# Patient Record
Sex: FEMALE | Race: WHITE | NOT HISPANIC OR LATINO | Employment: PART TIME | URBAN - METROPOLITAN AREA
[De-identification: names, ages, dates, MRNs, and addresses within clinical notes are randomized per-mention and may not be internally consistent; named-entity substitution may affect disease eponyms.]

---

## 2017-03-17 ENCOUNTER — OFFICE VISIT (OUTPATIENT)
Dept: OBSTETRICS AND GYNECOLOGY | Age: 42
End: 2017-03-17

## 2017-03-17 VITALS
DIASTOLIC BLOOD PRESSURE: 70 MMHG | SYSTOLIC BLOOD PRESSURE: 120 MMHG | WEIGHT: 149 LBS | HEIGHT: 65 IN | BODY MASS INDEX: 24.83 KG/M2

## 2017-03-17 DIAGNOSIS — N64.4 BREAST TENDERNESS: Primary | ICD-10-CM

## 2017-03-17 PROCEDURE — 99213 OFFICE O/P EST LOW 20 MIN: CPT | Performed by: NURSE PRACTITIONER

## 2017-03-17 NOTE — PROGRESS NOTES
"Subjective   Megan Nielsen is a 41 y.o. female is here today as a self referral.    History of Present Illness   Chief Complaint   Patient presents with   • Breast Problem     Change in appearance of right breast         Patient here with some questions about breast tenderness and some areas on her right nipple that she hadn't noticed before.  She thinks that the tenderness is cyclical the more that she has watched it and she is currently not having breast tenderness at all but still wanted to come in to be checked.  The area on her nipple has not changed since she first noticed it and the only reason she noticed it was because she was doing a self breast exam due to tenderness.  She does have some breast cancer in her family.  She also admits to drinking \"a lot\" of caffeine and states that has increased lately.  Her breast tenderness has been more around midcycle and then seems to go away with her period.  She has not had any nipple discharge or changes in other breast tissue.      The following portions of the patient's history were reviewed and updated as appropriate: allergies, current medications, past family history, past medical history, past social history, past surgical history and problem list.    Review of Systems   Constitutional: Negative.    HENT: Negative.    Eyes: Negative.    Respiratory: Negative.    Cardiovascular: Negative.    Gastrointestinal: Negative.    Endocrine: Negative.    Genitourinary: Negative.    Musculoskeletal: Negative.    Skin: Negative.    Allergic/Immunologic: Negative.    Neurological: Negative.    Hematological: Negative.    Psychiatric/Behavioral: Negative.        Objective   Physical Exam   Constitutional: She appears well-developed and well-nourished.   Pulmonary/Chest: Right breast exhibits no inverted nipple, no mass, no nipple discharge, no skin change and no tenderness. Left breast exhibits no inverted nipple, no mass, no nipple discharge, no skin change and no " tenderness.   Breast exam is normal.  There are some small, white sebaceous glands noted on both nipples.  These are the areas that she had never noticed before.  There is no nipple discharge or redness.           Assessment/Plan   Megan was seen today for breast problem.    Diagnoses and all orders for this visit:    Breast tenderness      Discussed breast changes and exam with patient.  Areas on bilateral nipples look like normal, sebaceous glands.  If she feels like there continues to be changes or concerns she may consider a consult with a breast surgeon.  I also offered a diagnostic mammogram for her increased breast tenderness but she declines it at this time and would prefer to observe for a little longer and try decreasing her caffeine intake.  She will follow up for a breast check with Dr Wood in 4-6 weeks but will call prior to that if she notices any further changes.  She will also try vitamin e and primrose oil supplements as well as decreasing caffeine.

## 2017-04-19 ENCOUNTER — OFFICE VISIT (OUTPATIENT)
Dept: OBSTETRICS AND GYNECOLOGY | Age: 42
End: 2017-04-19

## 2017-04-19 VITALS
WEIGHT: 151 LBS | SYSTOLIC BLOOD PRESSURE: 100 MMHG | BODY MASS INDEX: 25.16 KG/M2 | HEIGHT: 65 IN | DIASTOLIC BLOOD PRESSURE: 64 MMHG

## 2017-04-19 DIAGNOSIS — N64.4 MASTODYNIA OF RIGHT BREAST: Primary | ICD-10-CM

## 2017-04-19 PROCEDURE — 99213 OFFICE O/P EST LOW 20 MIN: CPT | Performed by: OBSTETRICS & GYNECOLOGY

## 2017-04-19 NOTE — PROGRESS NOTES
Subjective     Chief Complaint   Patient presents with   • Gynecologic Exam     6 wk follow up breast check       History of Present Illness  Megan Nielsen is a 41 y.o. female is being seen today for Reevaluation of right breast mastodynia.  Patient denies any type of discharge bleeding or palpable mass.  She was seen 6 weeks ago and had a relatively normal examination.  Chief Complaint   Patient presents with   • Gynecologic Exam     6 wk follow up breast check   .        The following portions of the patient's history were reviewed and updated as appropriate: allergies, current medications, past family history, past medical history, past social history, past surgical history and problem list.    PAST MEDICAL HISTORY  Past Medical History:   Diagnosis Date   • ASCUS of cervix with negative high risk HPV    • Bacterial pneumonia    • H/O bladder infections    • Pyelonephritis    • Vaginal delivery    • Varicose vein of leg      OB History     No data available        Past Surgical History:   Procedure Laterality Date   • TUBAL ABDOMINAL LIGATION       No family history on file.  History   Smoking Status   • Never Smoker   Smokeless Tobacco   • Not on file     No current outpatient prescriptions on file.    There is no immunization history on file for this patient.    Review of Systems       Except as outlined in history of physical illness, patient denies any changes in her GYN, , GI systems. All other systems reviewed are negative.Has no breast discharge, no change in appetite, no change in bowel movements, no  change in urinary function, and no constitutional changes    Objective   Physical Exam     Alert and oriented, abdomen soft nontender, breast or even and symmetrical.  There is no lymphadenopathy no erythema no asymmetry.  Normal sebaceous glands glands a Ward were discussed and pointed out.      Assessment/Plan   Megan was seen today for gynecologic exam.    Diagnoses and all orders for this  visit:    Mastodynia of right breast    Resolved with normal examination again today.  Patient will call with any changes or concerns otherwise return after her husbands 4 months medical.  For annual examination and mammogram             EMR Dragon/ Transcription disclaimer:  Much of the encounter note is an electronic transcription/translation of spoken language to printed text. The electronic translation of spoken language may permit erroneous, or at times, nonessential words or phrases to be inadvertently transcribes; Although i have reviewed the note for such errors, some may still exist.

## 2017-12-11 ENCOUNTER — OFFICE VISIT (OUTPATIENT)
Dept: OBSTETRICS AND GYNECOLOGY | Age: 42
End: 2017-12-11

## 2017-12-11 VITALS
BODY MASS INDEX: 27.32 KG/M2 | WEIGHT: 164 LBS | DIASTOLIC BLOOD PRESSURE: 80 MMHG | HEIGHT: 65 IN | SYSTOLIC BLOOD PRESSURE: 140 MMHG

## 2017-12-11 DIAGNOSIS — Z01.419 ENCOUNTER FOR GYNECOLOGICAL EXAMINATION: Primary | ICD-10-CM

## 2017-12-11 PROCEDURE — 99396 PREV VISIT EST AGE 40-64: CPT | Performed by: OBSTETRICS & GYNECOLOGY

## 2017-12-11 NOTE — PROGRESS NOTES
"Subjective     Chief Complaint   Patient presents with   • Gynecologic Exam     Annual         History of Present Illness      Megan Nielsen is a very pleasant  42 y.o. female who presents for annual exam.  Mammo Exam Today, Contraception discussed, Exercise twice weekly.      Obstetric History:  OB History      Para Term  AB Living    3 3 3       SAB TAB Ectopic Multiple Live Births                 Menstrual History:     Patient's last menstrual period was 2017.       Sexual History:       Past Medical History:   Diagnosis Date   • ASCUS of cervix with negative high risk HPV    • Bacterial pneumonia    • H/O bladder infections    • Pyelonephritis    • Vaginal delivery    • Varicose vein of leg      Past Surgical History:   Procedure Laterality Date   • TUBAL ABDOMINAL LIGATION         SOCIAL Hx:      The following portions of the patient's history were reviewed and updated as appropriate: allergies, current medications, past family history, past medical history, past social history, past surgical history and problem list.    Review of Systems        Except as outlined in history of physical illness, patient denies any changes in her GYN, , GI systems.  All other systems reviewed were negative.         Objective   Physical Exam    /80  Ht 165.7 cm (65.25\")  Wt 74.4 kg (164 lb)  LMP 2017  BMI 27.08 kg/m2    General: Patient is alert and oriented and appears overall healthy  Neck: Is supple without thyromegaly, no carotid bruits and no lymphadenopathy  Lungs: Clear bilaterally, no wheezing, rhonchi, or rales.  Respiratory rate is normal  Breast: Even, symmetrical, no lymphadenopathy, no retraction, no masses or cysts  Heart: Regular rate and rhythm are appreciated, no murmurs or rubs are heard  Abdomen: Is soft, without organomegaly, bowel sounds are positive, there is no                                rebound or guarding and palpation does not produce any discomfort  Back: " Nontender without CVA tenderness  Pelvic: External genitalia appear normal and consistent with mature female.  BUS normal                            Vagina is clean dry without discharge and appears adequately estrogenized, no               lesions or masses are present                         Cervix is noninflamed without discharge or lesions.  There is no cervical motion             tenderness.                Uterus is nonenlarged, without tenderness, and no masses or abnormalities are  present               Adnexa are non-enlarged, non tender               Rectal exam reveals adequate sphincter tone and no masses or lesions are                     appreciated on digital rectal examination.    There is no problem list on file for this patient.                Assessment/Plan   Megan was seen today for gynecologic exam.    Diagnoses and all orders for this visit:    Encounter for gynecological examination  -     IGP, Apt HPV,rfx 16 / 18,45 - ThinPrep Vial, Cervix  -     Vitamin D 25 Hydroxy  -     Comprehensive metabolic panel  -     Lipid panel  -     CBC and Differential  -     TSH        Annual Well Woman Exam    Discussed today's findings and concerns with patient in detail.  Continue to recommend regular exercise to include cardiovascular and resistance training and breast self-exam. Wellness lab, mammography, and pap smear, in accordance with age guidelines.  Nutritional  recommendations  were discussed.    All of the patient's questions were addressed and answered, I have encouraged her to call for today's test results if she has not received them within 10 days.  Patient is advised to call with any change in her condition or with any other questions, otherwise return in 12 months for annual examination.

## 2017-12-15 LAB
CYTOLOGIST CVX/VAG CYTO: ABNORMAL
CYTOLOGY CVX/VAG DOC THIN PREP: ABNORMAL
DX ICD CODE: ABNORMAL
DX ICD CODE: ABNORMAL
HIV 1 & 2 AB SER-IMP: ABNORMAL
HPV I/H RISK 4 DNA CVX QL PROBE+SIG AMP: NEGATIVE
OTHER STN SPEC: ABNORMAL
PATH REPORT.FINAL DX SPEC: ABNORMAL
PATHOLOGIST CVX/VAG CYTO: ABNORMAL
STAT OF ADQ CVX/VAG CYTO-IMP: ABNORMAL

## 2018-12-18 ENCOUNTER — OFFICE VISIT (OUTPATIENT)
Dept: OBSTETRICS AND GYNECOLOGY | Age: 43
End: 2018-12-18

## 2018-12-18 VITALS
WEIGHT: 170 LBS | BODY MASS INDEX: 28.32 KG/M2 | DIASTOLIC BLOOD PRESSURE: 80 MMHG | SYSTOLIC BLOOD PRESSURE: 120 MMHG | HEIGHT: 65 IN

## 2018-12-18 DIAGNOSIS — Z13.220 SCREENING CHOLESTEROL LEVEL: ICD-10-CM

## 2018-12-18 DIAGNOSIS — Z01.419 ENCOUNTER FOR GYNECOLOGICAL EXAMINATION: Primary | ICD-10-CM

## 2018-12-18 PROCEDURE — 99396 PREV VISIT EST AGE 40-64: CPT | Performed by: OBSTETRICS & GYNECOLOGY

## 2018-12-18 RX ORDER — DOXYCYCLINE HYCLATE 100 MG/1
100 CAPSULE ORAL 2 TIMES DAILY
COMMUNITY
End: 2022-02-02

## 2018-12-18 NOTE — PROGRESS NOTES
"Subjective     Chief Complaint   Patient presents with   • Gynecologic Exam     Annual:last pap 2017,mammo appt 2019         History of Present Illness      Megan Nielsen is a very pleasant  43 y.o. female who presents for annual exam.  Mammo Exam scheduled for January, Contraception tubal ligation, Exercise 2 times a week elliptical and hand weights  Patient overall is doing well no gynecological concerns or complaints she is exercising regularly she has a mammogram scheduled in January she is getting her Pap smear today..      Obstetric History:  OB History      Para Term  AB Living    3 3 3          SAB TAB Ectopic Molar Multiple Live Births                        Menstrual History:     Patient's last menstrual period was 2018 (approximate).       Sexual History:       Past Medical History:   Diagnosis Date   • ASCUS of cervix with negative high risk HPV    • Bacterial pneumonia    • H/O bladder infections    • Pyelonephritis    • Rosacea    • Vaginal delivery    • Varicose vein of leg      Past Surgical History:   Procedure Laterality Date   • TUBAL ABDOMINAL LIGATION         SOCIAL Hx:      The following portions of the patient's history were reviewed and updated as appropriate: allergies, current medications, past family history, past medical history, past social history, past surgical history and problem list.    Review of Systems        Except as outlined in history of physical illness, patient denies any changes in her GYN, , GI systems.  All other systems reviewed were negative.         Objective   Physical Exam    /80   Ht 165.7 cm (65.25\")   Wt 77.1 kg (170 lb)   LMP 2018 (Approximate)   BMI 28.07 kg/m²     General: Patient is alert and oriented and appears overall healthy  Neck: Is supple without thyromegaly, no carotid bruits and no lymphadenopathy  Lungs: Clear bilaterally, no wheezing, rhonchi, or rales.  Respiratory rate is normal  Breast: Even, " symmetrical, no lymphadenopathy, no retraction, no masses or cysts  Heart: Regular rate and rhythm are appreciated, no murmurs or rubs are heard  Abdomen: Is soft, without organomegaly, bowel sounds are positive, there is no                                rebound or guarding and palpation does not produce any discomfort  Back: Nontender without CVA tenderness  Pelvic: External genitalia appear normal and consistent with mature female.  BUS normal                            Vagina is clean dry without discharge and appears adequately estrogenized, no               lesions or masses are present                         Cervix is noninflamed without discharge or lesions.  There is no cervical motion             tenderness.                Uterus is nonenlarged, without tenderness, and no masses or abnormalities are  present               Adnexa are non-enlarged, non tender               Rectal exam reveals adequate sphincter tone and no masses or lesions are                     appreciated on digital rectal examination.    There is no problem list on file for this patient.                Assessment/Plan   Megan was seen today for gynecologic exam.    Diagnoses and all orders for this visit:    Encounter for gynecological examination  -     IGP, Apt HPV,rfx 16 / 18,45  -     CBC (No Diff)  -     Comprehensive Metabolic Panel  -     Thyroid Cascade Profile  -     Lipid Panel  -     Vitamin D 25 Hydroxy    Screening cholesterol level  -     CBC (No Diff)  -     Comprehensive Metabolic Panel  -     Thyroid Cascade Profile  -     Lipid Panel  -     Vitamin D 25 Hydroxy        Annual Well Woman Exam    Discussed today's findings and concerns with patient in detail.  Continue to recommend regular exercise to include cardiovascular and resistance training and breast self-exam. Wellness lab, mammography, and pap smear, in accordance with age guidelines.  Nutritional  recommendations  were discussed.    All of the patient's  questions were addressed and answered, I have encouraged her to call for today's test results if she has not received them within 10 days.  Patient is advised to call with any change in her condition or with any other questions, otherwise return in 12 months for annual examination.

## 2018-12-19 LAB
25(OH)D3+25(OH)D2 SERPL-MCNC: 30.7 NG/ML (ref 30–100)
ALBUMIN SERPL-MCNC: 4.7 G/DL (ref 3.5–5.2)
ALBUMIN/GLOB SERPL: 2.1 G/DL
ALP SERPL-CCNC: 39 U/L (ref 39–117)
ALT SERPL-CCNC: 17 U/L (ref 1–33)
AST SERPL-CCNC: 14 U/L (ref 1–32)
BILIRUB SERPL-MCNC: 1.1 MG/DL (ref 0.1–1.2)
BUN SERPL-MCNC: 17 MG/DL (ref 6–20)
BUN/CREAT SERPL: 20.7 (ref 7–25)
CALCIUM SERPL-MCNC: 9.6 MG/DL (ref 8.6–10.5)
CHLORIDE SERPL-SCNC: 103 MMOL/L (ref 98–107)
CHOLEST SERPL-MCNC: 195 MG/DL (ref 0–200)
CO2 SERPL-SCNC: 23.1 MMOL/L (ref 22–29)
CREAT SERPL-MCNC: 0.82 MG/DL (ref 0.57–1)
ERYTHROCYTE [DISTWIDTH] IN BLOOD BY AUTOMATED COUNT: 12.8 % (ref 11.7–13)
GLOBULIN SER CALC-MCNC: 2.2 GM/DL
GLUCOSE SERPL-MCNC: 102 MG/DL (ref 65–99)
HCT VFR BLD AUTO: 40.9 % (ref 35.6–45.5)
HDLC SERPL-MCNC: 68 MG/DL (ref 40–60)
HGB BLD-MCNC: 13.8 G/DL (ref 11.9–15.5)
LDLC SERPL CALC-MCNC: 110 MG/DL (ref 0–100)
MCH RBC QN AUTO: 30.3 PG (ref 26.9–32)
MCHC RBC AUTO-ENTMCNC: 33.7 G/DL (ref 32.4–36.3)
MCV RBC AUTO: 89.7 FL (ref 80.5–98.2)
PLATELET # BLD AUTO: 178 10*3/MM3 (ref 140–500)
POTASSIUM SERPL-SCNC: 3.9 MMOL/L (ref 3.5–5.2)
PROT SERPL-MCNC: 6.9 G/DL (ref 6–8.5)
RBC # BLD AUTO: 4.56 10*6/MM3 (ref 3.9–5.2)
SODIUM SERPL-SCNC: 142 MMOL/L (ref 136–145)
TRIGL SERPL-MCNC: 87 MG/DL (ref 0–150)
TSH SERPL DL<=0.005 MIU/L-ACNC: 1.01 UIU/ML (ref 0.45–4.5)
VLDLC SERPL CALC-MCNC: 17.4 MG/DL (ref 5–40)
WBC # BLD AUTO: 4.76 10*3/MM3 (ref 4.5–10.7)

## 2018-12-20 ENCOUNTER — TELEPHONE (OUTPATIENT)
Dept: OBSTETRICS AND GYNECOLOGY | Age: 43
End: 2018-12-20

## 2018-12-20 LAB
CYTOLOGIST CVX/VAG CYTO: NORMAL
CYTOLOGY CVX/VAG DOC THIN PREP: NORMAL
DX ICD CODE: NORMAL
HIV 1 & 2 AB SER-IMP: NORMAL
HPV I/H RISK 4 DNA CVX QL PROBE+SIG AMP: NEGATIVE
Lab: NORMAL
OTHER STN SPEC: NORMAL
PATH REPORT.FINAL DX SPEC: NORMAL
STAT OF ADQ CVX/VAG CYTO-IMP: NORMAL

## 2018-12-20 NOTE — TELEPHONE ENCOUNTER
----- Message from Adis Wood MD sent at 12/19/2018  4:09 PM EST -----  Please notify pt. That labs are with in normal limits

## 2019-01-08 ENCOUNTER — PROCEDURE VISIT (OUTPATIENT)
Dept: OBSTETRICS AND GYNECOLOGY | Age: 44
End: 2019-01-08

## 2019-01-08 ENCOUNTER — APPOINTMENT (OUTPATIENT)
Dept: WOMENS IMAGING | Facility: HOSPITAL | Age: 44
End: 2019-01-08

## 2019-01-08 DIAGNOSIS — Z12.31 VISIT FOR SCREENING MAMMOGRAM: Primary | ICD-10-CM

## 2019-01-08 PROCEDURE — 77067 SCR MAMMO BI INCL CAD: CPT | Performed by: RADIOLOGY

## 2019-01-08 PROCEDURE — 77067 SCR MAMMO BI INCL CAD: CPT | Performed by: OBSTETRICS & GYNECOLOGY

## 2019-01-22 ENCOUNTER — TELEPHONE (OUTPATIENT)
Dept: OBSTETRICS AND GYNECOLOGY | Age: 44
End: 2019-01-22

## 2019-01-22 DIAGNOSIS — R92.8 ABNORMAL MAMMOGRAM: Primary | ICD-10-CM

## 2019-01-22 NOTE — TELEPHONE ENCOUNTER
----- Message from Adis Wood MD sent at 1/22/2019  4:26 PM EST -----  Please contact patient and notify her mammogram was incomplete. She needs a follow-up imaging study and I am putting those orders and.

## 2019-01-22 NOTE — PROGRESS NOTES
Please contact patient and notify her mammogram was incomplete. She needs a follow-up imaging study and I am putting those orders and.

## 2019-01-25 ENCOUNTER — DOCUMENTATION (OUTPATIENT)
Dept: OBSTETRICS AND GYNECOLOGY | Age: 44
End: 2019-01-25

## 2019-01-31 ENCOUNTER — APPOINTMENT (OUTPATIENT)
Dept: WOMENS IMAGING | Facility: HOSPITAL | Age: 44
End: 2019-01-31

## 2019-01-31 PROCEDURE — 77065 DX MAMMO INCL CAD UNI: CPT | Performed by: RADIOLOGY

## 2019-01-31 PROCEDURE — G0279 TOMOSYNTHESIS, MAMMO: HCPCS | Performed by: RADIOLOGY

## 2019-01-31 PROCEDURE — 77061 BREAST TOMOSYNTHESIS UNI: CPT | Performed by: RADIOLOGY

## 2019-01-31 PROCEDURE — 76641 ULTRASOUND BREAST COMPLETE: CPT | Performed by: RADIOLOGY

## 2019-01-31 PROCEDURE — MDREVIEWSP: Performed by: RADIOLOGY

## 2020-02-03 ENCOUNTER — APPOINTMENT (OUTPATIENT)
Dept: WOMENS IMAGING | Facility: HOSPITAL | Age: 45
End: 2020-02-03

## 2020-02-03 PROCEDURE — 77063 BREAST TOMOSYNTHESIS BI: CPT | Performed by: RADIOLOGY

## 2020-02-03 PROCEDURE — 77067 SCR MAMMO BI INCL CAD: CPT | Performed by: RADIOLOGY

## 2020-07-27 ENCOUNTER — OFFICE VISIT (OUTPATIENT)
Dept: OBSTETRICS AND GYNECOLOGY | Age: 45
End: 2020-07-27

## 2020-07-27 VITALS
HEIGHT: 65 IN | BODY MASS INDEX: 29.99 KG/M2 | WEIGHT: 180 LBS | DIASTOLIC BLOOD PRESSURE: 70 MMHG | SYSTOLIC BLOOD PRESSURE: 128 MMHG

## 2020-07-27 DIAGNOSIS — Z01.419 ENCOUNTER FOR GYNECOLOGICAL EXAMINATION: Primary | ICD-10-CM

## 2020-07-27 PROCEDURE — 99396 PREV VISIT EST AGE 40-64: CPT | Performed by: OBSTETRICS & GYNECOLOGY

## 2020-07-27 NOTE — PROGRESS NOTES
"Subjective     Chief Complaint   Patient presents with   • Gynecologic Exam     Annual:last pap ,mammo , Discuss breast tenderness,FYI: + Covid ,diagnosed 19.         History of Present Illness      Megan Nielsen is a very pleasant  45 y.o. female who presents for annual exam.  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      Obstetric History:  OB History        3    Para   3    Term   3            AB        Living           SAB        TAB        Ectopic        Molar        Multiple        Live Births                   Menstrual History:     Patient's last menstrual period was 2020 (approximate).       Sexual History:       Past Medical History:   Diagnosis Date   • ASCUS of cervix with negative high risk HPV    • Bacterial pneumonia    • H/O bladder infections    • Pyelonephritis    • Rosacea    • Vaginal delivery    • Varicose vein of leg      Past Surgical History:   Procedure Laterality Date   • TUBAL ABDOMINAL LIGATION         SOCIAL Hx:      The following portions of the patient's history were reviewed and updated as appropriate: allergies, current medications, past family history, past medical history, past social history, past surgical history and problem list.    Review of Systems        Except as outlined in history of physical illness, patient denies any changes in her GYN, , GI systems.  All other systems reviewed were negative.         Objective   Physical Exam    /70   Ht 165.7 cm (65.25\")   Wt 81.6 kg (180 lb)   LMP 2020 (Approximate)   Breastfeeding No   BMI 29.72 kg/m²     General: Patient is alert and oriented and appears overall healthy  Neck: Is supple without thyromegaly, no carotid bruits and no " lymphadenopathy  Lungs: Clear bilaterally, no wheezing, rhonchi, or rales.  Respiratory rate is normal  Breast: Even, symmetrical, no lymphadenopathy, no retraction, no masses or cysts  Heart: Regular rate and rhythm are appreciated, no murmurs or rubs are heard  Abdomen: Is soft, without organomegaly, bowel sounds are positive, there is no                                rebound or guarding and palpation does not produce any discomfort  Back: Nontender without CVA tenderness  Pelvic: External genitalia appear normal and consistent with mature female.  BUS normal                            Vagina is clean dry without discharge and appears adequately estrogenized, no               lesions or masses are present                         Cervix is noninflamed without discharge or lesions.  There is no cervical motion             tenderness.                Uterus is nonenlarged, without tenderness, and no masses or abnormalities are  present               Adnexa are non-enlarged, non tender               Rectal exam reveals adequate sphincter tone and no masses or lesions are                     appreciated on digital rectal examination.      Annual Well Woman Exam  There is no problem list on file for this patient.                Assessment/Plan   Megan was seen today for gynecologic exam.    Diagnoses and all orders for this visit:    Encounter for gynecological examination  -     IGP, Apt HPV,rfx 16 / 18,45  -     CBC (No Diff)  -     Comprehensive Metabolic Panel  -     Thyroid Cascade Profile  -     Lipid Panel  -     Vitamin D 25 Hydroxy      Discussed today's findings and concerns with patient.  Continue to recommend regular exercise including cardiovascular and resistance training as well as  breast self-exam. Wellness lab, mammography, & pap smear, in accordance with age guidelines.    I have encouraged her to call for today's test results if she has not received them within 10 days.  Patient is advised to call  with any change in her condition or with any other questions, otherwise return  for annual examination.

## 2020-07-30 LAB
CYTOLOGIST CVX/VAG CYTO: NORMAL
CYTOLOGY CVX/VAG DOC CYTO: NORMAL
CYTOLOGY CVX/VAG DOC THIN PREP: NORMAL
DX ICD CODE: NORMAL
HIV 1 & 2 AB SER-IMP: NORMAL
HPV I/H RISK 4 DNA CVX QL PROBE+SIG AMP: NEGATIVE
Lab: NORMAL
OTHER STN SPEC: NORMAL
STAT OF ADQ CVX/VAG CYTO-IMP: NORMAL

## 2021-02-24 ENCOUNTER — TELEPHONE (OUTPATIENT)
Dept: OBSTETRICS AND GYNECOLOGY | Age: 46
End: 2021-02-24

## 2021-02-24 DIAGNOSIS — Z12.31 ENCOUNTER FOR SCREENING MAMMOGRAM FOR BREAST CANCER: Primary | ICD-10-CM

## 2021-03-31 ENCOUNTER — APPOINTMENT (OUTPATIENT)
Dept: WOMENS IMAGING | Facility: HOSPITAL | Age: 46
End: 2021-03-31

## 2021-03-31 PROCEDURE — 77067 SCR MAMMO BI INCL CAD: CPT | Performed by: RADIOLOGY

## 2021-03-31 PROCEDURE — 77063 BREAST TOMOSYNTHESIS BI: CPT | Performed by: RADIOLOGY

## 2021-04-06 ENCOUNTER — BULK ORDERING (OUTPATIENT)
Dept: CASE MANAGEMENT | Facility: OTHER | Age: 46
End: 2021-04-06

## 2021-04-06 DIAGNOSIS — Z23 IMMUNIZATION DUE: ICD-10-CM

## 2022-02-02 ENCOUNTER — OFFICE VISIT (OUTPATIENT)
Dept: OBSTETRICS AND GYNECOLOGY | Age: 47
End: 2022-02-02

## 2022-02-02 VITALS
WEIGHT: 184 LBS | DIASTOLIC BLOOD PRESSURE: 68 MMHG | BODY MASS INDEX: 28.88 KG/M2 | SYSTOLIC BLOOD PRESSURE: 116 MMHG | HEIGHT: 67 IN

## 2022-02-02 DIAGNOSIS — Z12.4 SCREENING FOR CERVICAL CANCER: ICD-10-CM

## 2022-02-02 DIAGNOSIS — Z98.51 S/P TUBAL LIGATION: ICD-10-CM

## 2022-02-02 DIAGNOSIS — Z01.419 WELL WOMAN EXAM WITH ROUTINE GYNECOLOGICAL EXAM: Primary | ICD-10-CM

## 2022-02-02 PROCEDURE — 99396 PREV VISIT EST AGE 40-64: CPT | Performed by: NURSE PRACTITIONER

## 2022-02-02 NOTE — PROGRESS NOTES
Subjective     Chief Complaint   Patient presents with   • Gynecologic Exam     AE, last pap 2020 neg/hpv neg, mg 2021, left breast more sensitive around cycle       History of Present Illness    Megan Nielsen is a 46 y.o.  who presents for annual exam.    Doing well  Three children, doing well  Working as RN for [employer]  Last mammogram was benign 3/31/21  She her mammo scheduled at [facility] at the beginning of April  S/p BTL  Her menses are regular every 28-30 days, lasting 4-7 days, dysmenorrhea none   She has no complaints today  Pt of Dr. Wood  Obstetric History:  OB History        3    Para   3    Term   3            AB        Living           SAB        IAB        Ectopic        Molar        Multiple        Live Births                   Menstrual History:     Patient's last menstrual period was 2022.         Current contraception: tubal ligation  History of abnormal Pap smear: yes - ascus  Received Gardasil immunization: no  Perform regular self breast exam: yes - .  Family history of uterine or ovarian cancer: no  Family History of colon cancer: no  Family history of breast cancer: yes - see hx    Mammogram: ordered.  Colonoscopy: not indicated.  DEXA: not indicated.    Exercise: moderately active  Calcium/Vitamin D: adequate intake, takes vitamin D    The following portions of the patient's history were reviewed and updated as appropriate: allergies, current medications, past family history, past medical history, past social history, past surgical history and problem list.    Review of Systems   Constitutional: Negative.    Respiratory: Negative.    Cardiovascular: Negative.    Gastrointestinal: Negative.    Genitourinary: Negative.    Skin: Negative.    Psychiatric/Behavioral: Negative.            Objective   Physical Exam  Constitutional:       General: She is awake.      Appearance: Normal appearance. She is well-developed.   HENT:      Head: Normocephalic and atraumatic.       Nose: Nose normal.   Neck:      Thyroid: No thyroid mass, thyromegaly or thyroid tenderness.   Cardiovascular:      Rate and Rhythm: Normal rate and regular rhythm.      Pulses: Normal pulses.      Heart sounds: Normal heart sounds.   Pulmonary:      Effort: Pulmonary effort is normal.      Breath sounds: Normal breath sounds.   Chest:   Breasts: Breasts are symmetrical.      Right: Normal. No swelling, bleeding, inverted nipple, mass, nipple discharge, skin change, tenderness or supraclavicular adenopathy.      Left: Normal. No swelling, bleeding, inverted nipple, mass, nipple discharge, skin change, tenderness or supraclavicular adenopathy.       Abdominal:      General: Abdomen is flat. Bowel sounds are normal.      Palpations: Abdomen is soft.      Tenderness: There is no abdominal tenderness.   Genitourinary:     General: Normal vulva.      Labia:         Right: No rash, tenderness, lesion or injury.         Left: No rash, tenderness, lesion or injury.       Urethra: No prolapse, urethral pain, urethral swelling or urethral lesion.      Vagina: Normal. No signs of injury. No vaginal discharge, erythema, tenderness, bleeding, lesions or prolapsed vaginal walls.      Cervix: No discharge, friability, lesion, erythema or cervical bleeding.      Uterus: Normal. Not enlarged, not tender and no uterine prolapse.       Adnexa: Right adnexa normal and left adnexa normal.        Right: No mass, tenderness or fullness.          Left: No mass, tenderness or fullness.        Rectum: Normal. No mass.   Musculoskeletal:      Cervical back: Normal range of motion and neck supple.   Lymphadenopathy:      Upper Body:      Right upper body: No supraclavicular adenopathy.      Left upper body: No supraclavicular adenopathy.   Skin:     General: Skin is warm and dry.   Neurological:      General: No focal deficit present.      Mental Status: She is alert and oriented to person, place, and time.   Psychiatric:         Mood and  "Affect: Mood normal.         Behavior: Behavior normal. Behavior is cooperative.         Thought Content: Thought content normal.         Judgment: Judgment normal.         /68   Ht 170.2 cm (67\")   Wt 83.5 kg (184 lb)   LMP 01/30/2022   BMI 28.82 kg/m²     Assessment/Plan   Diagnoses and all orders for this visit:    1. Well woman exam with routine gynecological exam (Primary)    2. Screening for cervical cancer  -     IGP, Apt HPV,rfx 16 / 18,45    3. S/P tubal ligation        All questions answered.  Breast self exam technique reviewed and patient encouraged to perform self-exam monthly.  Discussed healthy lifestyle modifications.  Recommended 30 minutes of aerobic exercise five times per week.  Discussed calcium needs to prevent osteoporosis.                   "

## 2022-05-02 ENCOUNTER — APPOINTMENT (OUTPATIENT)
Dept: WOMENS IMAGING | Facility: HOSPITAL | Age: 47
End: 2022-05-02

## 2022-05-02 PROCEDURE — 77067 SCR MAMMO BI INCL CAD: CPT | Performed by: RADIOLOGY

## 2022-05-02 PROCEDURE — 77063 BREAST TOMOSYNTHESIS BI: CPT | Performed by: RADIOLOGY

## 2022-05-10 ENCOUNTER — TELEPHONE (OUTPATIENT)
Dept: OBSTETRICS AND GYNECOLOGY | Age: 47
End: 2022-05-10

## 2022-05-10 DIAGNOSIS — R92.8 ABNORMAL MAMMOGRAM: Primary | ICD-10-CM

## 2022-05-10 NOTE — TELEPHONE ENCOUNTER
----- Message from Adis Wood MD sent at 5/10/2022 10:15 AM EDT -----  Please notify patient radiologist is recommending an ultrasound and further imaging before they can complete their official reading of her mammogram

## 2022-05-26 ENCOUNTER — APPOINTMENT (OUTPATIENT)
Dept: WOMENS IMAGING | Facility: HOSPITAL | Age: 47
End: 2022-05-26

## 2022-05-26 PROCEDURE — 76642 ULTRASOUND BREAST LIMITED: CPT | Performed by: RADIOLOGY

## 2022-05-26 PROCEDURE — 77065 DX MAMMO INCL CAD UNI: CPT | Performed by: RADIOLOGY

## 2022-05-26 PROCEDURE — G0279 TOMOSYNTHESIS, MAMMO: HCPCS | Performed by: RADIOLOGY

## 2022-05-26 PROCEDURE — 77061 BREAST TOMOSYNTHESIS UNI: CPT | Performed by: RADIOLOGY

## 2022-06-01 ENCOUNTER — TELEPHONE (OUTPATIENT)
Dept: OBSTETRICS AND GYNECOLOGY | Age: 47
End: 2022-06-01

## 2022-06-01 DIAGNOSIS — R92.8 ABNORMAL MAMMOGRAM: Primary | ICD-10-CM

## 2022-06-01 NOTE — TELEPHONE ENCOUNTER
----- Message from Adis Wood MD sent at 6/1/2022  1:05 PM EDT -----  Angelica the radiologist [facility] want another procedure on this patient.  I have put the order in.  Please make sure she is aware thank you

## 2022-06-20 ENCOUNTER — TELEPHONE (OUTPATIENT)
Dept: OBSTETRICS AND GYNECOLOGY | Age: 47
End: 2022-06-20

## 2022-06-20 NOTE — TELEPHONE ENCOUNTER
Felicia,    Can you please place an order for a [facility] Mammogram ([facility]).    Thanks,    Adri

## 2022-06-21 DIAGNOSIS — Z98.890 STATUS POST BREAST BIOPSY: Primary | ICD-10-CM

## 2022-06-23 ENCOUNTER — APPOINTMENT (OUTPATIENT)
Dept: WOMENS IMAGING | Facility: HOSPITAL | Age: 47
End: 2022-06-23

## 2022-06-23 PROCEDURE — 19083 BX BREAST 1ST LESION US IMAG: CPT | Performed by: RADIOLOGY

## 2022-06-23 PROCEDURE — A4648 IMPLANTABLE TISSUE MARKER: HCPCS | Performed by: RADIOLOGY

## 2022-09-20 ENCOUNTER — OFFICE VISIT (OUTPATIENT)
Dept: OBSTETRICS AND GYNECOLOGY | Age: 47
End: 2022-09-20

## 2022-09-20 VITALS
DIASTOLIC BLOOD PRESSURE: 74 MMHG | WEIGHT: 181 LBS | SYSTOLIC BLOOD PRESSURE: 134 MMHG | BODY MASS INDEX: 28.41 KG/M2 | HEIGHT: 67 IN

## 2022-09-20 DIAGNOSIS — N93.9 ABNORMAL UTERINE BLEEDING (AUB): Primary | ICD-10-CM

## 2022-09-20 PROCEDURE — 99213 OFFICE O/P EST LOW 20 MIN: CPT | Performed by: NURSE PRACTITIONER

## 2022-09-20 RX ORDER — NORETHINDRONE ACETATE AND ETHINYL ESTRADIOL, ETHINYL ESTRADIOL AND FERROUS FUMARATE 1MG-10(24)
1 KIT ORAL DAILY
Qty: 28 TABLET | Refills: 2 | COMMUNITY
Start: 2022-09-20

## 2022-09-20 NOTE — PROGRESS NOTES
"Subjective     Chief Complaint   Patient presents with   • Menstrual Problem     abnormal bleeding, long & frequent; pt c/o having a period in the middle of her cycle x2 months        Megan Nielsen is a 47 y.o.  whose LMP is Patient's last menstrual period was 2022 (exact date).     Pt presents today with chief complaint of irregular menses  This started in July  Prior to July menses were regular  She has been having two periods a month for the last two months  Denies pain, abnormal discharge  No STD risk  Normal thyroid in July  Pt of Dr. Wood      No Additional Complaints Reported    The following portions of the patient's history were reviewed and updated as appropriate:vital signs, allergies, current medications, past medical history, past social history, past surgical history and problem list      Review of Systems   Pertinent items are noted in HPI.     Objective      /74   Ht 170.2 cm (67\")   Wt 82.1 kg (181 lb)   LMP 2022 (Exact Date)   Breastfeeding No   BMI 28.35 kg/m²     Physical Exam    General:   alert and no distress   Heart: Not performed today   Lungs: Not performed today.   Breast: Not performed today   Neck: na   Abdomen: {Not performed today   CVA: Not performed today   Pelvis: Vagina: Normal  Urinary system: urethral meatus normal  Vaginal: normal mucosa without prolapse or lesions, normal without tenderness, induration or masses and normal rugae  Cervix: normal appearance   Extremities: Not performed today   Neurologic: negative   Psychiatric: Normal affect, judgement, and mood       Lab Review   Labs: No data reviewed     Imaging   Ultrasound - Pelvic Vaginal    Assessment & Plan     ASSESSMENT  1. Abnormal uterine bleeding (AUB)        PLAN  1.   Orders Placed This Encounter   Procedures   • Follicle Stimulating Hormone       2. Medications prescribed this encounter:        New Medications Ordered This Visit   Medications   • Norethin-Eth Estrad-Fe Biphas (Lo " Loestrin Fe) 1 MG-10 MCG / 10 MCG tablet     Sig: Take 1 tablet by mouth Daily.     Dispense:  28 tablet     Refill:  2       3. Check FSH. Likely perimenopause. Will trial 3 months of Lo loestrin. Discussed r/b/a, use and side effects. If irregular menses continues would recommend endometrial biopsy. She has follow up in February with Dr. Wood.      Felicia Ivey, APRN  9/20/2022

## 2022-09-21 LAB — FSH SERPL-ACNC: 6.5 MIU/ML

## 2022-12-27 ENCOUNTER — APPOINTMENT (OUTPATIENT)
Dept: WOMENS IMAGING | Facility: HOSPITAL | Age: 47
End: 2022-12-27
Payer: COMMERCIAL

## 2022-12-27 DIAGNOSIS — R92.8 ABNORMAL MAMMOGRAM: Primary | ICD-10-CM

## 2022-12-27 PROCEDURE — 76642 ULTRASOUND BREAST LIMITED: CPT | Performed by: RADIOLOGY

## 2023-02-14 ENCOUNTER — OFFICE VISIT (OUTPATIENT)
Dept: OBSTETRICS AND GYNECOLOGY | Age: 48
End: 2023-02-14
Payer: COMMERCIAL

## 2023-02-14 VITALS
HEIGHT: 67 IN | SYSTOLIC BLOOD PRESSURE: 124 MMHG | DIASTOLIC BLOOD PRESSURE: 72 MMHG | WEIGHT: 188.8 LBS | BODY MASS INDEX: 29.63 KG/M2

## 2023-02-14 DIAGNOSIS — Z11.51 SCREENING FOR HUMAN PAPILLOMAVIRUS: ICD-10-CM

## 2023-02-14 DIAGNOSIS — Z01.419 ENCOUNTER FOR GYNECOLOGICAL EXAMINATION WITHOUT ABNORMAL FINDING: Primary | ICD-10-CM

## 2023-02-14 DIAGNOSIS — Z12.4 SCREENING FOR CERVICAL CANCER: ICD-10-CM

## 2023-02-14 DIAGNOSIS — Z12.31 BREAST CANCER SCREENING BY MAMMOGRAM: ICD-10-CM

## 2023-02-14 DIAGNOSIS — N95.1 PERIMENOPAUSAL: ICD-10-CM

## 2023-02-14 PROCEDURE — 99396 PREV VISIT EST AGE 40-64: CPT | Performed by: OBSTETRICS & GYNECOLOGY

## 2023-02-14 NOTE — PROGRESS NOTES
Subjective     Chief Complaint   Patient presents with   • Gynecologic Exam     AE Today, Last AE 2022 (-), HPV (-), MG 2022         History of Present Illness    Wellness exam  Megan Nielsen is a very pleasant  47 y.o. female who presents for annual exam.  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    She had some irregular cycles last year but they straighten themselves out never took the birth control pill that was offered.    She had an abnormal mammogram underwent a biopsy which was benign had a follow-up and now is due for her regular screening mammogram in 6 months which has been ordered      Obstetric History:  OB History        3    Para   3    Term   3            AB        Living           SAB        IAB        Ectopic        Molar        Multiple        Live Births                   Menstrual History:     Patient's last menstrual period was 2023 (exact date).       Sexual History:       Past Medical History:   Diagnosis Date   • ASCUS of cervix with negative high risk HPV    • Bacterial pneumonia    • H/O bladder infections    • Pyelonephritis    • Rosacea    • Vaginal delivery    • Varicose vein of leg      Past Surgical History:   Procedure Laterality Date   • TUBAL ABDOMINAL LIGATION         SOCIAL Hx:      The following portions of the patient's history were reviewed and updated as appropriate: allergies, current medications, past family history, past medical history, past social history, past surgical history and problem list.    Review of Systems        Except as outlined in history of physical illness, patient denies any changes in her GYN, , GI systems.  All other systems reviewed were negative.         Current Outpatient Medications:   •  Cholecalciferol (VITAMIN D) 1000 units tablet, Take 1,000 Units by mouth Daily., Disp: , Rfl:   •  Norethin-Eth Estrad-Fe Biphas (Lo Loestrin Fe) 1 MG-10 MCG / 10 MCG  "tablet, Take 1 tablet by mouth Daily., Disp: 28 tablet, Rfl: 2   Objective   Physical Exam    /72   Ht 170.2 cm (67\")   Wt 85.6 kg (188 lb 12.8 oz)   LMP 02/01/2023 (Exact Date)   BMI 29.57 kg/m²     General: Patient is alert and oriented and appears overall healthy  Neck: Is supple without thyromegaly, no carotid bruits and no lymphadenopathy  Lungs: Clear bilaterally, no wheezing, rhonchi, or rales.  Respiratory rate is normal  Breast: Even, symmetrical, no lymphadenopathy, no retraction, no masses or cysts  Heart: Regular rate and rhythm are appreciated, no murmurs or rubs are heard  Abdomen: Is soft, without organomegaly, bowel sounds are positive, there is no rebound or guarding and palpation does not produce any discomfort  Back: Nontender without CVA tenderness  Pelvic: External genitalia appear normal and consistent with mature female.  BUS normal                            Vagina is clean dry without discharge and appears adequately estrogenized, no lesions or masses are present                         Cervix is noninflamed without discharge or lesions.  There is no cervical motion tenderness.                Uterus is nonenlarged, without tenderness, and no masses or abnormalities are  present               Adnexa are non-enlarged, non tender               Rectal exam reveals adequate sphincter tone and no masses or lesions are appreciated on digital rectal examination.      Annual Well Woman Exam  There is no problem list on file for this patient.                Assessment & Plan   Diagnoses and all orders for this visit:    1. Encounter for gynecological examination without abnormal finding (Primary)  -     IGP, Apt HPV,rfx 16 / 18,45    2. Screening for human papillomavirus  -     IGP, Apt HPV,rfx 16 / 18,45    3. Breast cancer screening by mammogram    4. Screening for cervical cancer    5. Perimenopausal      Discussed today's findings and concerns with patient.  Continue to recommend regular " exercise including cardiovascular and resistance training as well as  breast self-exam. Wellness lab, mammography, & pap smear, in accordance with age guidelines.    I have encouraged her to call for today's test results if she has not received them within 10 days.  Patient is advised to call with any change in her condition or with any other questions, otherwise return  for annual examination.

## 2023-02-21 LAB
CYTOLOGIST CVX/VAG CYTO: NORMAL
CYTOLOGY CVX/VAG DOC CYTO: NORMAL
CYTOLOGY CVX/VAG DOC THIN PREP: NORMAL
DX ICD CODE: NORMAL
HIV 1 & 2 AB SER-IMP: NORMAL
HPV I/H RISK 4 DNA CVX QL PROBE+SIG AMP: NORMAL
Lab: NORMAL
OTHER STN SPEC: NORMAL
REQUEST PROBLEM: NORMAL
STAT OF ADQ CVX/VAG CYTO-IMP: NORMAL

## 2023-02-27 ENCOUNTER — TELEPHONE (OUTPATIENT)
Dept: OBSTETRICS AND GYNECOLOGY | Age: 48
End: 2023-02-27
Payer: COMMERCIAL

## 2023-02-27 NOTE — TELEPHONE ENCOUNTER
Pt called asking about her pap results. Can you please review them so we can let the pt know. thanks

## 2023-11-27 ENCOUNTER — TELEPHONE (OUTPATIENT)
Dept: OBSTETRICS AND GYNECOLOGY | Age: 48
End: 2023-11-27
Payer: COMMERCIAL

## 2023-11-27 DIAGNOSIS — Z12.39 BREAST CANCER SCREENING, HIGH RISK PATIENT: Primary | ICD-10-CM

## 2023-11-27 NOTE — TELEPHONE ENCOUNTER
Caller: Megan Nielsen    Relationship: Self    Best call back number: [phone]    Caller requesting test results: PATIENT     What test was performed: CT SCAN    When was the test performed: 11/8/23    Where was the test performed: [facility]    Additional notes: PATIENT CALLED AND WOULD LIKE TO SPEAK WITH SOMEONE REGARDING THE RESULTS OF HER RECENT CT SCAN

## 2023-11-28 NOTE — TELEPHONE ENCOUNTER
LVM for pt to ask if she wants this scheduled at [facility] (where she had the CT scan).  Prior breast imaging was at [facility] but they may not be able to see the CT scan images.

## 2023-12-06 ENCOUNTER — APPOINTMENT (OUTPATIENT)
Dept: WOMENS IMAGING | Facility: HOSPITAL | Age: 48
End: 2023-12-06
Payer: COMMERCIAL

## 2023-12-06 PROCEDURE — G0279 TOMOSYNTHESIS, MAMMO: HCPCS | Performed by: RADIOLOGY

## 2023-12-06 PROCEDURE — 76642 ULTRASOUND BREAST LIMITED: CPT | Performed by: RADIOLOGY

## 2023-12-06 PROCEDURE — 77065 DX MAMMO INCL CAD UNI: CPT | Performed by: RADIOLOGY

## 2023-12-06 PROCEDURE — 77061 BREAST TOMOSYNTHESIS UNI: CPT | Performed by: RADIOLOGY

## 2023-12-21 ENCOUNTER — TELEPHONE (OUTPATIENT)
Dept: OBSTETRICS AND GYNECOLOGY | Age: 48
End: 2023-12-21
Payer: COMMERCIAL

## 2023-12-21 NOTE — TELEPHONE ENCOUNTER
Patient is wanting to speak with  regarding her CT scan from 11/23 and her last diagnostic mammogram and breast ultrasound. She is confused because the report read as following up in 6 months,but she is concerned that if something was seen,she wants it taken care of now rather than later. It does not appear that a comparison to her last routine mammogram in 6/23 was used with her CT,where they found a problem with her rt.breast.Pt.aware  is out of the office until 10/26/23.

## 2023-12-26 ENCOUNTER — TELEPHONE (OUTPATIENT)
Dept: OBSTETRICS AND GYNECOLOGY | Age: 48
End: 2023-12-26
Payer: COMMERCIAL

## 2023-12-26 DIAGNOSIS — Z12.31 BREAST CANCER SCREENING BY MAMMOGRAM: Primary | ICD-10-CM

## 2023-12-27 ENCOUNTER — TELEPHONE (OUTPATIENT)
Dept: SURGERY | Facility: CLINIC | Age: 48
End: 2023-12-27
Payer: COMMERCIAL

## 2023-12-27 NOTE — TELEPHONE ENCOUNTER
New patient chart prepared for Dr Felicia Gonzalez to review,  new patient referral from Dr. Adis Wood for high risks breast ca.

## 2023-12-29 ENCOUNTER — TELEPHONE (OUTPATIENT)
Dept: SURGERY | Facility: CLINIC | Age: 48
End: 2023-12-29
Payer: COMMERCIAL

## 2024-01-02 ENCOUNTER — TELEPHONE (OUTPATIENT)
Dept: SURGERY | Facility: CLINIC | Age: 49
End: 2024-01-02
Payer: COMMERCIAL

## 2024-01-02 NOTE — TELEPHONE ENCOUNTER
Baltazarm for pt to call back to martin new pt appt with bi car[facility]ion for at high risk for breast cancer

## 2024-01-03 ENCOUNTER — TELEPHONE (OUTPATIENT)
Dept: SURGERY | Facility: CLINIC | Age: 49
End: 2024-01-03
Payer: COMMERCIAL

## 2024-01-03 NOTE — TELEPHONE ENCOUNTER
Spoke to pt and got her scheduled for a new pt appt with bi carrion for high risk of breast cancer on 01/15    Mailed out new pt paperwork   Pt stated understanding   Verified address

## 2024-01-03 NOTE — TELEPHONE ENCOUNTER
----- Message from Jamilah Montano sent at 1/3/2024  2:04 PM EST -----  Contact: [phone]  Pt calling to schedule new pt appt returning your call from yesterday

## 2024-01-12 NOTE — PROGRESS NOTES
[facility]     Referring Provider: Adis Wood MD     Chief complaint: abnormal breast imaging     HPI: Ms. Megan Nielsen is a 47 yo woman, seen at the request of Adis Wood MD, for abnormal breast imaging    I personally reviewed her records and summarized her relevant breast history/imagin2022 bilateral screening mammogram at [facility]  There are scattered areas of fibroglandular density.  There is an isodense focal asymmetry measuring 11 mm seen in the posterior one-third upper outer region of the  right breast. There are no significant changes from the prior exam(s).  In the left breast, no suspicious masses, significant calcifications or other abnormalities are seen.  IMPRESSION:  Focal asymmetry in the right breast requires additional evaluation. Diagnostic mammogram and limited breast  ultrasound is recommended. The notification letter was mailed to the patient.  BI-RADS Category 0:    2022 right diagnostic mammogram and right limited breast ultrasound at [facility]  There are scattered areas of fibroglandular density.  Additional evaluation was performed for the focal asymmetry in the right breast, upper outer seen on 2022. On  the present examination, there is an isodense focal asymmetry in the posterior one-third region of the right breast  at 12 o'clock located 6 centimeters from the nipple.  This persists on the additional images and requires further  evaluation.  RIGHT BREAST REALTIME LIMITED BREAST ULTRASOUND  High resolution real-time ultrasound scanning was performed by the ultrasound technologist. Still images were  obtained by the ultrasound technologist and submitted for radiologist review.  Ultrasound demonstrates an irregular area of mixed echogenicity with poorly defined margins measuring 12 x 5 x 11  mm in the posterior one-third region of the right breast at 12 o'clock located 6 centimeters from the nipple.  IMPRESSION:  Area of mixed echogenicity in the right breast  is suspicious. Ultrasound guided vacuum assisted biopsy is  recommended. The patient was mailed a notification letter.  BI-RADS Category 4B: Suspicious Abnormality    6/23/2022 right ultrasound-guided vacuum-assisted biopsy [facility]  Pathology returned as benign fibroadipose tissue.  Hyalinized stromal fibrosis.  No evidence of atypia or  malignancy.  .  Pathology is benign and concordant.  Pathology results were called to the patient by Anna Navarro RN on 06/27/2022 at 3:37 PM.  The results  and recommendations were discussed with the patient in depth.  The patient voiced understanding and had no  further questions.  A follow-up ultrasound in 6 months is recommended.    12/27/2022 right limited breast ultrasound at [facility]  There is a biopsy clip seen in the right breast at 12 o'clock. This finding represents biopsy proven benign breast  disease.  No solid or suspicious masses seen and no findings to suggest malignancy at this time.  IMPRESSION:  Biopsy clip in the right breast is benign-negative.  A return to screening in 5 months is recommended.  BI-RADS Category 2: Benign Finding(s)    6/5/2023 bilateral screening mammogram at [facility]  There are scattered areas of fibroglandular density.  There are stable areas of asymmetric breast tissue seen in both breasts.  Note is made of a biopsy clip in the  right breast as evidence of a previous procedure.  No suspicious masses, suspicious microcalcifications or  architectural distortions are identified.  There has been no significant change from the prior exam(s).  IMPRESSION:  Stable areas of asymmetric breast tissue in both breasts are benign-negative.  Screening mammogram in 1 year is recommended.  The patient was mailed a notification letter.  BI-RADS Category 2: Benign Finding(s)    11/8/2023 CT of chest without contrast at [facility]  IMPRESSION:   1. Stable noncalcified pulmonary nodules, most indicative of a benign   etiology.   2. No active pulmonary disease.   3. Symmetric  ill-defined soft tissue involving the superior aspect of   the right breast, slightly less pronounced on today's study.   Questionable associated biopsy clip versus focal calcification. Please   correlate with history of prior biopsy. Otherwise,  a diagnostic   mammogram and ultrasound would be recommended for further   characterization. Asymmetric parenchymal tissue is favored that this   should be confirmed.     12/6/2023 right diagnostic mammogram and right limited breast ultrasound at [facility]  There are scattered areas of fibroglandular density.  There is a stable focal asymmetry with associated biopsy clip seen in the posterior right breast at 12 o'clock,  allowing for differences in positioning and inclusion of additional posteromedial tissue on the current CC  projection.  Post-biopsy hematoma seen in this region shows decrease over time.   There are no new suspicious  masses, calcifications, or areas of architectural distortion.  RIGHT BREAST REALTIME LIMITED BREAST ULTRASOUND  High resolution real-time ultrasound scanning was performed by the ultrasound technologist. Still images were  obtained by the ultrasound technologist and submitted for radiologist review.  An echogenic biopsy  marker is seen in the expected position at 12:00, 6 cm from nipple.  There are no suspicious  masses, areas of focal shadowing or distortion seen.  IMPRESSION:  Stable focal asymmetry with biopsy marker in the right breast is probably benign. Follow-up mammography in 6 months  is recommended, to ensure inclusion of this posterior tissue and document continued expected benign evolution of   BI-RADS Category 3: Probably Benign Finding(s)      She has a family history of breast cancer in her maternal grandmother dx age 39, maternal aunt dx age 45.  She denies any family history of ovarian cancer.     Today she presents with concerns regarding the need for further mammogram in 6 months.  She had a CT of the chest to follow-up on  pulmonary nodules and that CT showed an asymmetry in the right breast she followed up with a diagnostic right mammogram and ultrasound was placed.  BI-RADS 3 and suggested another mammogram in 6 months.  She denies any breast lumps, pain, skin changes, or nipple discharge.         Review of Systems - Oncology    Medications:    Current Outpatient Medications:     Cholecalciferol (VITAMIN D) 1000 units tablet, Take 1 tablet by mouth Daily. (Patient not taking: Reported on 1/15/2024), Disp: , Rfl:     Norethin-Eth Estrad-Fe Biphas (Lo Loestrin Fe) 1 MG-10 MCG / 10 MCG tablet, Take 1 tablet by mouth Daily. (Patient not taking: Reported on 1/15/2024), Disp: 28 tablet, Rfl: 2    Allergies:  Allergies   Allergen Reactions    Penicillins Anaphylaxis       Medical history:  Past Medical History:   Diagnosis Date    ASCUS of cervix with negative high risk HPV     Bacterial pneumonia     H/O bladder infections     Pyelonephritis     Rosacea     Vaginal delivery     Varicose vein of leg        Surgical History:  Past Surgical History:   Procedure Laterality Date    TUBAL ABDOMINAL LIGATION         Family History:  Family History   Problem Relation Age of Onset    No Known Problems Mother     No Known Problems Father     No Known Problems Sister     No Known Problems Brother     No Known Problems Daughter     No Known Problems Son     Breast cancer Maternal Grandmother     No Known Problems Paternal Grandmother     Breast cancer Maternal Aunt     No Known Problems Paternal Aunt     BRCA 1/2 Neg Hx     Colon cancer Neg Hx     Endometrial cancer Neg Hx     Ovarian cancer Neg Hx        Social History:   Social History     Socioeconomic History    Marital status:    Tobacco Use    Smoking status: Never     Passive exposure: Never    Smokeless tobacco: Never   Substance and Sexual Activity    Alcohol use: No    Drug use: No     Patient drinks 2 servings of caffeine per day.       GYNECOLOGIC HISTORY:   G: 3. P: 3. AB:  0.  Last menstrual period: 2023  Age at menarche: 13  Age at first childbirth: 27  Lactation/How lon months   Age at menopause: N/A  Total years of oral contraceptive use: N/A  Total years of hormone replacement therapy: N/A      Physical Exam  Vitals:    01/15/24 1406   BP: 112/68   Pulse: 84   SpO2: 98%     ECOG 0 - Asymptomatic  General: NAD, well appearing  Psych: a&o x 3, normal mood and affect  Eyes: EOMI, no scleral icterus  ENMT: neck supple without masses or thyromegaly, mucus membranes moist  Resp: normal effort, CTAB  CV: RRR, no murmurs, no edema   GI: soft, NT, ND  MSK: normal gait, normal ROM in bilateral shoulders  Lymph nodes: no cervical, supraclavicular or axillary lymphadenopathy  Breast: symmetric,   Right: No visible abnormalities on inspection while seated, with arms raised or hands on hips. No masses, skin changes, or nipple abnormalities.  Left: No visible abnormalities on inspection while seated, with arms raised or hands on hips. No masses, skin changes, or nipple abnormalities.      Assessment:    Abnormal breast imaging    Discussion:  According to [facility] she is average risk for breast cancer 17.3%  2. I explained the concept of a BI-RADS 3 designation and the process of imaging surveillance. We discussed that a BI-RADS 3 designation describes an imaging finding that is 98-99% likely to represent a benign process. We discussed that the most common management of a BI-RADS 3 finding is initial 6 month imaging surveillance and that the entire period of imaging surveillance can often take 2 years.     Plan:  Rayo dx mammo in  followed by exam  Monthly self breast exams  Rto if any new breast concerns    KAMARI Major    I have spent 40 mins in face to face time with the patient and in chart review.    CC:  MD Andrez Negrete, KAMARI Solis    EMR Dragon/transcription disclaimer:  Dictated using Dragon dictation

## 2024-01-15 ENCOUNTER — OFFICE VISIT (OUTPATIENT)
Dept: SURGERY | Facility: CLINIC | Age: 49
End: 2024-01-15
Payer: COMMERCIAL

## 2024-01-15 VITALS
OXYGEN SATURATION: 98 % | WEIGHT: 188 LBS | SYSTOLIC BLOOD PRESSURE: 112 MMHG | HEART RATE: 84 BPM | HEIGHT: 67 IN | BODY MASS INDEX: 29.51 KG/M2 | DIASTOLIC BLOOD PRESSURE: 68 MMHG

## 2024-01-15 DIAGNOSIS — R92.8 ABNORMAL FINDING ON BREAST IMAGING: Primary | ICD-10-CM

## 2024-01-15 PROCEDURE — 99213 OFFICE O/P EST LOW 20 MIN: CPT | Performed by: NURSE PRACTITIONER

## 2024-01-24 ENCOUNTER — TELEPHONE (OUTPATIENT)
Dept: SURGERY | Facility: CLINIC | Age: 49
End: 2024-01-24
Payer: COMMERCIAL

## 2024-02-26 ENCOUNTER — OFFICE VISIT (OUTPATIENT)
Dept: OBSTETRICS AND GYNECOLOGY | Age: 49
End: 2024-02-26
Payer: COMMERCIAL

## 2024-02-26 VITALS
DIASTOLIC BLOOD PRESSURE: 76 MMHG | SYSTOLIC BLOOD PRESSURE: 128 MMHG | BODY MASS INDEX: 30.45 KG/M2 | WEIGHT: 194 LBS | HEIGHT: 67 IN

## 2024-02-26 DIAGNOSIS — Z13.71 BRCA NEGATIVE: Primary | ICD-10-CM

## 2024-02-26 DIAGNOSIS — N95.1 PERIMENOPAUSAL: ICD-10-CM

## 2024-02-26 DIAGNOSIS — Z01.419 ENCOUNTER FOR GYNECOLOGICAL EXAMINATION WITHOUT ABNORMAL FINDING: ICD-10-CM

## 2024-02-26 PROBLEM — R92.8 ABNORMAL FINDING ON BREAST IMAGING: Status: RESOLVED | Noted: 2024-01-15 | Resolved: 2024-02-26

## 2024-02-26 PROCEDURE — 99396 PREV VISIT EST AGE 40-64: CPT | Performed by: OBSTETRICS & GYNECOLOGY

## 2024-02-26 PROCEDURE — 99459 PELVIC EXAMINATION: CPT | Performed by: OBSTETRICS & GYNECOLOGY

## 2024-02-26 NOTE — PROGRESS NOTES
"Subjective     Chief Complaint   Patient presents with    Annual Exam     Annual exam last pap 2023 neg/no hpv done, m/g 2023 (dx 2023)         History of Present Illness    Wellness exam  Megan Nielsen is a very pleasant  48 y.o. female who presents for annual exam.  Mammo Exam follow-up [facility], Contraception yes yes, Exercise   .      Obstetric History:  OB History          3    Para   3    Term   3            AB        Living             SAB        IAB        Ectopic        Molar        Multiple        Live Births                   Menstrual History:     Patient's last menstrual period was 2024 (exact date).       Sexual History:       Past Medical History:   Diagnosis Date    ASCUS of cervix with negative high risk HPV     Bacterial pneumonia     H/O bladder infections     Pyelonephritis     Rosacea     Vaginal delivery     Varicose vein of leg      Past Surgical History:   Procedure Laterality Date    TUBAL ABDOMINAL LIGATION         SOCIAL Hx:      The following portions of the patient's history were reviewed and updated as appropriate: allergies, current medications, past family history, past medical history, past social history, past surgical history and problem list.    Review of Systems        Except as outlined in history of physical illness, patient denies any changes in her GYN, , GI systems.  All other systems reviewed were negative.         Current Outpatient Medications:     Cholecalciferol (VITAMIN D) 1000 units tablet, Take 1 tablet by mouth Daily., Disp: , Rfl:    Objective   Physical Exam    /76   Ht 170.2 cm (67\")   Wt 88 kg (194 lb)   LMP 2024 (Exact Date)   BMI 30.38 kg/m²     General: Patient is alert and oriented and appears overall healthy  Neck: Is supple without thyromegaly, no carotid bruits and no lymphadenopathy  Lungs: Clear bilaterally, no wheezing, " rhonchi, or rales.  Respiratory rate is normal  Breast: Even, symmetrical, no lymphadenopathy, no retraction, no discharge, no masses or cysts  Heart: Regular rate and rhythm are appreciated, no murmurs or rubs are heard  Abdomen: Is soft, without organomegaly, bowel sounds are positive, there is no rebound or guarding and palpation does not produce any discomfort  Back: Nontender without CVA tenderness  Pelvic: External genitalia appear normal and consistent with mature female.  BUS normal                            Vagina is clean dry without discharge and appears adequately estrogenized, no lesions or masses are present                         Cervix is noninflamed without discharge or lesions.  There is no cervical motion tenderness.                Uterus is nonenlarged, without tenderness, and no masses or abnormalities are  present               Adnexa are non-enlarged, non tender               Rectal exam reveals adequate sphincter tone and no masses or lesions are appreciated on digital rectal examination.      Annual Well Woman Exam  Patient Active Problem List   Diagnosis    BRCA negative                 Assessment & Plan   Diagnoses and all orders for this visit:    1. BRCA negative (Primary)    2. Encounter for gynecological examination without abnormal finding    3. Perimenopausal    Follow-up imaging is performed  Pap smear today  Breast and pelvic exam reassuring    Discussed today's findings and concerns with patient.  Continue to recommend regular exercise including cardiovascular and resistance training as well as  breast self-exam. Wellness lab, mammography, & pap smear, in accordance with age guidelines.    I have encouraged her to call for today's test results if she has not received them within 10 days.  Patient is advised to call with any change in her condition or with any other questions, otherwise return  for annual examination.

## 2024-02-29 LAB
CYTOLOGIST CVX/VAG CYTO: NORMAL
CYTOLOGY CVX/VAG DOC CYTO: NORMAL
CYTOLOGY CVX/VAG DOC THIN PREP: NORMAL
DX ICD CODE: NORMAL
HPV I/H RISK 4 DNA CVX QL PROBE+SIG AMP: NEGATIVE
Lab: NORMAL
OTHER STN SPEC: NORMAL
STAT OF ADQ CVX/VAG CYTO-IMP: NORMAL

## 2024-04-12 ENCOUNTER — TELEPHONE (OUTPATIENT)
Dept: SURGERY | Facility: CLINIC | Age: 49
End: 2024-04-12
Payer: COMMERCIAL

## 2024-04-12 NOTE — TELEPHONE ENCOUNTER
Lvm for pt to let her know she has an apopt with KAMARI Major on 06/21 and provider will not be in office so we need to rsch

## 2024-06-12 ENCOUNTER — TELEPHONE (OUTPATIENT)
Dept: SURGERY | Facility: CLINIC | Age: 49
End: 2024-06-12
Payer: COMMERCIAL

## 2024-06-12 NOTE — TELEPHONE ENCOUNTER
Returning pt call and let her know on VM that her mammogram is at [facility] on 06/14 @ 2pm and if she has any questions she can give me a call back

## 2024-06-20 ENCOUNTER — APPOINTMENT (OUTPATIENT)
Dept: WOMENS IMAGING | Facility: HOSPITAL | Age: 49
End: 2024-06-20
Payer: COMMERCIAL

## 2024-06-20 PROCEDURE — 77066 DX MAMMO INCL CAD BI: CPT | Performed by: RADIOLOGY

## 2024-06-20 PROCEDURE — 77062 BREAST TOMOSYNTHESIS BI: CPT | Performed by: RADIOLOGY

## 2024-06-20 PROCEDURE — G0279 TOMOSYNTHESIS, MAMMO: HCPCS | Performed by: RADIOLOGY

## 2024-06-25 NOTE — PROGRESS NOTES
[facility]     Referring Provider: No ref. provider found     Chief complaint: abnormal breast imaging     HPI: Ms. Megan Nielsen is a 49 yo woman, seen at the request of No ref. provider found, for abnormal breast imaging    I personally reviewed her records and summarized her relevant breast history/imagin2022 bilateral screening mammogram at [facility]  There are scattered areas of fibroglandular density.  There is an isodense focal asymmetry measuring 11 mm seen in the posterior one-third upper outer region of the  right breast. There are no significant changes from the prior exam(s).  In the left breast, no suspicious masses, significant calcifications or other abnormalities are seen.  IMPRESSION:  Focal asymmetry in the right breast requires additional evaluation. Diagnostic mammogram and limited breast  ultrasound is recommended. The notification letter was mailed to the patient.  BI-RADS Category 0:    2022 right diagnostic mammogram and right limited breast ultrasound at [facility]  There are scattered areas of fibroglandular density.  Additional evaluation was performed for the focal asymmetry in the right breast, upper outer seen on 2022. On  the present examination, there is an isodense focal asymmetry in the posterior one-third region of the right breast  at 12 o'clock located 6 centimeters from the nipple.  This persists on the additional images and requires further  evaluation.  RIGHT BREAST REALTIME LIMITED BREAST ULTRASOUND  High resolution real-time ultrasound scanning was performed by the ultrasound technologist. Still images were  obtained by the ultrasound technologist and submitted for radiologist review.  Ultrasound demonstrates an irregular area of mixed echogenicity with poorly defined margins measuring 12 x 5 x 11  mm in the posterior one-third region of the right breast at 12 o'clock located 6 centimeters from the nipple.  IMPRESSION:  Area of mixed echogenicity in the  right breast is suspicious. Ultrasound guided vacuum assisted biopsy is  recommended. The patient was mailed a notification letter.  BI-RADS Category 4B: Suspicious Abnormality    6/23/2022 right ultrasound-guided vacuum-assisted biopsy [facility]  Pathology returned as benign fibroadipose tissue.  Hyalinized stromal fibrosis.  No evidence of atypia or  malignancy.  .  Pathology is benign and concordant.  Pathology results were called to the patient by Anna Navarro RN on 06/27/2022 at 3:37 PM.  The results  and recommendations were discussed with the patient in depth.  The patient voiced understanding and had no  further questions.  A follow-up ultrasound in 6 months is recommended.    12/27/2022 right limited breast ultrasound at [facility]  There is a biopsy clip seen in the right breast at 12 o'clock. This finding represents biopsy proven benign breast  disease.  No solid or suspicious masses seen and no findings to suggest malignancy at this time.  IMPRESSION:  Biopsy clip in the right breast is benign-negative.  A return to screening in 5 months is recommended.  BI-RADS Category 2: Benign Finding(s)    6/5/2023 bilateral screening mammogram at [facility]  There are scattered areas of fibroglandular density.  There are stable areas of asymmetric breast tissue seen in both breasts.  Note is made of a biopsy clip in the  right breast as evidence of a previous procedure.  No suspicious masses, suspicious microcalcifications or  architectural distortions are identified.  There has been no significant change from the prior exam(s).  IMPRESSION:  Stable areas of asymmetric breast tissue in both breasts are benign-negative.  Screening mammogram in 1 year is recommended.  The patient was mailed a notification letter.  BI-RADS Category 2: Benign Finding(s)    11/8/2023 CT of chest without contrast at [facility]  IMPRESSION:   1. Stable noncalcified pulmonary nodules, most indicative of a benign   etiology.   2. No active pulmonary disease.    3. Symmetric ill-defined soft tissue involving the superior aspect of   the right breast, slightly less pronounced on today's study.   Questionable associated biopsy clip versus focal calcification. Please   correlate with history of prior biopsy. Otherwise,  a diagnostic   mammogram and ultrasound would be recommended for further   characterization. Asymmetric parenchymal tissue is favored that this   should be confirmed.     12/6/2023 right diagnostic mammogram and right limited breast ultrasound at [facility]  There are scattered areas of fibroglandular density.  There is a stable focal asymmetry with associated biopsy clip seen in the posterior right breast at 12 o'clock,  allowing for differences in positioning and inclusion of additional posteromedial tissue on the current CC  projection.  Post-biopsy hematoma seen in this region shows decrease over time.   There are no new suspicious  masses, calcifications, or areas of architectural distortion.  RIGHT BREAST REALTIME LIMITED BREAST ULTRASOUND  High resolution real-time ultrasound scanning was performed by the ultrasound technologist. Still images were  obtained by the ultrasound technologist and submitted for radiologist review.  An echogenic biopsy  marker is seen in the expected position at 12:00, 6 cm from nipple.  There are no suspicious  masses, areas of focal shadowing or distortion seen.  IMPRESSION:  Stable focal asymmetry with biopsy marker in the right breast is probably benign. Follow-up mammography in 6 months  is recommended, to ensure inclusion of this posterior tissue and document continued expected benign evolution of   BI-RADS Category 3: Probably Benign Finding(s)      She has a family history of breast cancer in her maternal grandmother dx age 39, maternal aunt dx age 45.  She denies any family history of ovarian cancer.     Today she presents with concerns regarding the need for further mammogram in 6 months.  She had a CT of the chest to  follow-up on pulmonary nodules and that CT showed an asymmetry in the right breast she followed up with a diagnostic right mammogram and ultrasound was placed.  BI-RADS 3 and suggested another mammogram in 6 months.  She denies any breast lumps, pain, skin changes, or nipple discharge.    6/27/2024 interval history  Patient presenting to the office today for routine follow-up.  On 6/20/2024 she had a bilateral diagnostic mammogram that returned as BI-RADS 2 and called the focal asymmetry in the right breast benign. She has no new breast complaints today.     Review of Systems - Oncology    Medications:    Current Outpatient Medications:     Cholecalciferol (VITAMIN D) 1000 units tablet, Take 1 tablet by mouth Daily., Disp: , Rfl:     Allergies:  Allergies   Allergen Reactions    Penicillins Anaphylaxis       Medical history:  Past Medical History:   Diagnosis Date    ASCUS of cervix with negative high risk HPV     Bacterial pneumonia     H/O bladder infections     Pyelonephritis     Rosacea     Vaginal delivery     Varicose vein of leg        Surgical History:  Past Surgical History:   Procedure Laterality Date    TUBAL ABDOMINAL LIGATION         Family History:  Family History   Problem Relation Age of Onset    No Known Problems Mother     No Known Problems Father     No Known Problems Sister     No Known Problems Brother     No Known Problems Daughter     No Known Problems Son     Breast cancer Maternal Grandmother     No Known Problems Paternal Grandmother     Breast cancer Maternal Aunt     No Known Problems Paternal Aunt     BRCA 1/2 Neg Hx     Colon cancer Neg Hx     Endometrial cancer Neg Hx     Ovarian cancer Neg Hx        Social History:   Social History     Socioeconomic History    Marital status:    Tobacco Use    Smoking status: Never     Passive exposure: Never    Smokeless tobacco: Never   Substance and Sexual Activity    Alcohol use: No    Drug use: No     Patient drinks 2 servings of caffeine  per day.       GYNECOLOGIC HISTORY:   G: 3. P: 3. AB: 0.  Last menstrual period: 2023  Age at menarche: 13  Age at first childbirth: 27  Lactation/How lon months   Age at menopause: N/A  Total years of oral contraceptive use: N/A  Total years of hormone replacement therapy: N/A      Physical Exam  There were no vitals filed for this visit.    ECOG 0 - Asymptomatic  General: NAD, well appearing  Psych: a&o x 3, normal mood and affect  Eyes: EOMI, no scleral icterus  ENMT: neck supple without masses or thyromegaly, mucus membranes moist  Resp: normal effort, CTAB  CV: RRR, no murmurs, no edema   GI: soft, NT, ND  MSK: normal gait, normal ROM in bilateral shoulders  Lymph nodes: no cervical, supraclavicular or axillary lymphadenopathy  Breast: symmetric,   Right: No visible abnormalities on inspection while seated, with arms raised or hands on hips. No masses, skin changes, or nipple abnormalities.  Left: No visible abnormalities on inspection while seated, with arms raised or hands on hips. No masses, skin changes, or nipple abnormalities.      Assessment:    Abnormal breast imaging- resolved     Discussion:  According to [facility] she is average risk for breast cancer 17.3%  2. I explained the concept of a BI-RADS 3 designation and the process of imaging surveillance. We discussed that a BI-RADS 3 designation describes an imaging finding that is 98-99% likely to represent a benign process. We discussed that the most common management of a BI-RADS 3 finding is initial 6 month imaging surveillance and that the entire period of imaging surveillance can often take 2 years.     Plan:  I am not giving her a follow-up appointment in the office today due to imaging being normal and her not having new issues.  She is due for her bilateral screening mammogram in 2025 which can be ordered by her PCP  Monthly self breast exams  Rto if any new breast concerns    KAMARI Major    I have spent 40 mins in face to face  time with the patient and in chart review.    CC:  No ref. provider found  Claire Maguire APRN    EMR Dragon/transcription disclaimer:  Dictated using Dragon dictation

## 2024-06-27 ENCOUNTER — OFFICE VISIT (OUTPATIENT)
Dept: SURGERY | Facility: CLINIC | Age: 49
End: 2024-06-27
Payer: COMMERCIAL

## 2024-06-27 VITALS
SYSTOLIC BLOOD PRESSURE: 142 MMHG | HEART RATE: 104 BPM | OXYGEN SATURATION: 98 % | BODY MASS INDEX: 29.98 KG/M2 | HEIGHT: 67 IN | DIASTOLIC BLOOD PRESSURE: 88 MMHG | WEIGHT: 191 LBS

## 2024-06-27 DIAGNOSIS — R92.8 ABNORMAL FINDING ON BREAST IMAGING: Primary | ICD-10-CM

## 2024-06-27 PROCEDURE — 99213 OFFICE O/P EST LOW 20 MIN: CPT | Performed by: NURSE PRACTITIONER

## 2025-01-05 NOTE — TELEPHONE ENCOUNTER
Spoke to pt and let her know that her genetic test results came back normal and we will see her at her fu in June   Pt stated understanding   dysphonic

## 2025-03-03 ENCOUNTER — OFFICE VISIT (OUTPATIENT)
Dept: OBSTETRICS AND GYNECOLOGY | Age: 50
End: 2025-03-03
Payer: COMMERCIAL

## 2025-03-03 VITALS
WEIGHT: 147 LBS | BODY MASS INDEX: 23.07 KG/M2 | DIASTOLIC BLOOD PRESSURE: 68 MMHG | HEIGHT: 67 IN | SYSTOLIC BLOOD PRESSURE: 120 MMHG

## 2025-03-03 DIAGNOSIS — Z12.31 BREAST CANCER SCREENING BY MAMMOGRAM: ICD-10-CM

## 2025-03-03 DIAGNOSIS — Z12.4 SCREENING FOR CERVICAL CANCER: ICD-10-CM

## 2025-03-03 DIAGNOSIS — Z01.419 ENCOUNTER FOR GYNECOLOGICAL EXAMINATION: Primary | ICD-10-CM

## 2025-03-03 DIAGNOSIS — Z71.89 COUNSELING FOR HORMONE REPLACEMENT THERAPY: ICD-10-CM

## 2025-03-03 PROBLEM — Z13.71 BRCA NEGATIVE: Status: ACTIVE | Noted: 2025-03-03

## 2025-03-03 RX ORDER — ESTRADIOL 0.05 MG/D
1 PATCH, EXTENDED RELEASE TRANSDERMAL 2 TIMES WEEKLY
Qty: 24 PATCH | Refills: 7 | Status: SHIPPED | OUTPATIENT
Start: 2025-03-03

## 2025-03-03 RX ORDER — PROGESTERONE 100 MG/1
100 CAPSULE ORAL DAILY
Qty: 90 CAPSULE | Refills: 3 | Status: SHIPPED | OUTPATIENT
Start: 2025-03-03 | End: 2025-03-04 | Stop reason: SDUPTHER

## 2025-03-03 NOTE — PROGRESS NOTES
Subjective   Chief Complaint   Patient presents with    Gynecologic Exam     Annual:last pap ,mammo , Discuss hrt      History of Present Illness  Wellness exam  Megan Nielsen is a very pleasant  49 y.o. female .  , Mammo Exam 2024 order placed for this year, , Exercise doing quite well both cardio and resistance    She has really turned the corner, significantly changed her diet exercising regularly went from 200 pounds to 140 pounds    She is interested in HRT reviewed risk benefits pros and cons..    Obstetric History:  OB History          3    Para   3    Term   3            AB        Living             SAB        IAB        Ectopic        Molar        Multiple        Live Births                   Menstrual History:     Patient's last menstrual period was 2024 (approximate).       Sexual History:       Past Medical History:   Diagnosis Date    ASCUS of cervix with negative high risk HPV     Bacterial pneumonia     H/O bladder infections     Pyelonephritis     Rosacea     Vaginal delivery     Varicose vein of leg      Past Surgical History:   Procedure Laterality Date    TUBAL ABDOMINAL LIGATION         Current Outpatient Medications:     Cholecalciferol (VITAMIN D) 1000 units tablet, Take 1 tablet by mouth Daily., Disp: , Rfl:     estradiol (MINIVELLE, VIVELLE-DOT) 0.05 MG/24HR patch, Place 1 patch on the skin as directed by provider 2 (Two) Times a Week., Disp: 24 patch, Rfl: 7    Progesterone (PROMETRIUM) 100 MG capsule, Take 1 capsule by mouth Daily. Take on previously discussed today's of cycle, Disp: 90 capsule, Rfl: 3   SOCIAL Hx:  [unfilled]    The following portions of the patient's history were reviewed and updated as appropriate: allergies, current medications, past family history, past medical history, past social history, past surgical history and problem list.    Review of Systems      Urinary incontinence assessment discussed      Except as outlined in history of  "physical illness, patient denies any changes in her GYN, , GI systems.  All other systems reviewed are negative         Objective   Physical Exam    /68   Ht 170.2 cm (67.01\")   Wt 66.7 kg (147 lb)   LMP 12/01/2024 (Approximate)   BMI 23.02 kg/m²     General: Patient is alert and oriented and appears overall healthy  Neck: Is supple without thyromegaly, no carotid bruits and no lymphadenopathy  Lungs: Clear bilaterally, no wheezing, rhonchi, or rales.  Respiratory rate is normal  Breast: Even symmetrical, no lymphadenopathy, no retraction, no discharge ,no masses or lumps appreciated on either side  Heart: Regular rate and rhythm are appreciated, no murmurs or rubs are heard  Abdomen: Is soft, without organomegaly, bowel sounds are positive, there is no rebound or guarding and palpation does not produce any discomfort  Back: Nontender without CVA tenderness  Pelvic: External genitalia appear normal and consistent with mature female.  BUS normal                Urethra appears normal and without mass, bladder is nontender and without any lesions                        Urethral meatus is normal without scarring tenderness or masses                 Bladder is without tenderness or fullness                           Vagina is clean dry without discharge and , no lesions or masses are present                         Cervix is noninflamed without discharge or lesions.  There is no cervical motion tenderness.                Uterus is nonenlarged, without tenderness, and no masses or abnormalities are  present               Adnexa are non-enlarged, non tender               Rectal digital  exam reveals adequate sphincter tone and no masses or lesions are appreciated on digital rectal examination.       Patient Active Problem List   Diagnosis    BRCA negative    BRCA negative                Assessment & Plan   Diagnoses and all orders for this visit:    1. Encounter for gynecological examination (Primary)  -     " IGP, Apt HPV,rfx 16 / 18,45    2. Screening for cervical cancer    3. Counseling for hormone replacement therapy    4. Breast cancer screening by mammogram    Other orders  -     Progesterone (PROMETRIUM) 100 MG capsule; Take 1 capsule by mouth Daily. Take on previously discussed today's of cycle  Dispense: 90 capsule; Refill: 3  -     estradiol (MINIVELLE, VIVELLE-DOT) 0.05 MG/24HR patch; Place 1 patch on the skin as directed by provider 2 (Two) Times a Week.  Dispense: 24 patch; Refill: 7       Discussed today's findings and concerns with patient.  Continue to recommend regular exercise including cardiovascular and resistance training as well as  breast self-exam. Wellness lab, mammography, & pap smear, in accordance with age guidelines.    I have encouraged her to call for today's test results if she has not received them within 10 days.  Patient is advised to call with any change in her condition or with any other questions, otherwise return  for annual examination.

## 2025-03-04 RX ORDER — PROGESTERONE 100 MG/1
100 CAPSULE ORAL DAILY
Qty: 90 CAPSULE | Refills: 3 | Status: SHIPPED | OUTPATIENT
Start: 2025-03-04

## 2025-03-06 LAB
CYTOLOGIST CVX/VAG CYTO: NORMAL
CYTOLOGY CVX/VAG DOC CYTO: NORMAL
CYTOLOGY CVX/VAG DOC THIN PREP: NORMAL
DX ICD CODE: NORMAL
HPV I/H RISK 4 DNA CVX QL PROBE+SIG AMP: NEGATIVE
OTHER STN SPEC: NORMAL
SERVICE CMNT-IMP: NORMAL
STAT OF ADQ CVX/VAG CYTO-IMP: NORMAL

## 2025-03-17 RX ORDER — PROGESTERONE 100 MG/1
100 CAPSULE ORAL DAILY
Qty: 90 CAPSULE | Refills: 3 | Status: SHIPPED | OUTPATIENT
Start: 2025-03-17

## 2025-03-20 ENCOUNTER — PATIENT MESSAGE (OUTPATIENT)
Dept: OBSTETRICS AND GYNECOLOGY | Age: 50
End: 2025-03-20
Payer: COMMERCIAL

## 2025-03-24 RX ORDER — ESTRADIOL 0.05 MG/D
1 PATCH, EXTENDED RELEASE TRANSDERMAL 2 TIMES WEEKLY
Qty: 24 PATCH | Refills: 7 | Status: SHIPPED | OUTPATIENT
Start: 2025-03-24

## 2025-07-10 ENCOUNTER — APPOINTMENT (OUTPATIENT)
Dept: WOMENS IMAGING | Facility: HOSPITAL | Age: 50
End: 2025-07-10
Payer: COMMERCIAL

## 2025-07-10 PROCEDURE — 77063 BREAST TOMOSYNTHESIS BI: CPT | Performed by: RADIOLOGY

## 2025-07-10 PROCEDURE — 77067 SCR MAMMO BI INCL CAD: CPT | Performed by: RADIOLOGY

## 2025-07-16 DIAGNOSIS — N64.89 BREAST ASYMMETRY: Primary | ICD-10-CM

## 2025-07-29 ENCOUNTER — APPOINTMENT (OUTPATIENT)
Dept: WOMENS IMAGING | Facility: HOSPITAL | Age: 50
End: 2025-07-29
Payer: COMMERCIAL

## 2025-07-29 PROCEDURE — 76642 ULTRASOUND BREAST LIMITED: CPT | Performed by: RADIOLOGY

## 2025-07-29 PROCEDURE — 77061 BREAST TOMOSYNTHESIS UNI: CPT | Performed by: RADIOLOGY

## 2025-07-29 PROCEDURE — 77065 DX MAMMO INCL CAD UNI: CPT | Performed by: RADIOLOGY

## 2025-07-29 PROCEDURE — G0279 TOMOSYNTHESIS, MAMMO: HCPCS | Performed by: RADIOLOGY

## 2025-08-28 ENCOUNTER — TELEPHONE (OUTPATIENT)
Dept: OBSTETRICS AND GYNECOLOGY | Age: 50
End: 2025-08-28
Payer: COMMERCIAL